# Patient Record
Sex: FEMALE | URBAN - METROPOLITAN AREA
[De-identification: names, ages, dates, MRNs, and addresses within clinical notes are randomized per-mention and may not be internally consistent; named-entity substitution may affect disease eponyms.]

---

## 2019-07-19 ENCOUNTER — OUTPATIENT (OUTPATIENT)
Dept: OUTPATIENT SERVICES | Facility: HOSPITAL | Age: 69
LOS: 1 days | Discharge: HOME | End: 2019-07-19
Payer: COMMERCIAL

## 2019-07-19 VITALS
HEIGHT: 68 IN | RESPIRATION RATE: 16 BRPM | TEMPERATURE: 97 F | OXYGEN SATURATION: 100 % | HEART RATE: 61 BPM | WEIGHT: 128.75 LBS | DIASTOLIC BLOOD PRESSURE: 83 MMHG | SYSTOLIC BLOOD PRESSURE: 174 MMHG

## 2019-07-19 DIAGNOSIS — O03.9 COMPLETE OR UNSPECIFIED SPONTANEOUS ABORTION WITHOUT COMPLICATION: Chronic | ICD-10-CM

## 2019-07-19 DIAGNOSIS — Z98.891 HISTORY OF UTERINE SCAR FROM PREVIOUS SURGERY: Chronic | ICD-10-CM

## 2019-07-19 DIAGNOSIS — Z98.890 OTHER SPECIFIED POSTPROCEDURAL STATES: Chronic | ICD-10-CM

## 2019-07-19 RX ORDER — ONDANSETRON 8 MG/1
4 TABLET, FILM COATED ORAL EVERY 8 HOURS
Refills: 0 | Status: DISCONTINUED | OUTPATIENT
Start: 2019-07-19 | End: 2019-07-20

## 2019-07-19 RX ORDER — SODIUM CHLORIDE 9 MG/ML
1000 INJECTION, SOLUTION INTRAVENOUS
Refills: 0 | Status: DISCONTINUED | OUTPATIENT
Start: 2019-07-19 | End: 2019-07-20

## 2019-07-19 RX ORDER — HEPARIN SODIUM 5000 [USP'U]/ML
5000 INJECTION INTRAVENOUS; SUBCUTANEOUS ONCE
Refills: 0 | Status: DISCONTINUED | OUTPATIENT
Start: 2019-07-19 | End: 2019-07-19

## 2019-07-19 RX ORDER — CEFAZOLIN SODIUM 1 G
1000 VIAL (EA) INJECTION EVERY 8 HOURS
Refills: 0 | Status: DISCONTINUED | OUTPATIENT
Start: 2019-07-19 | End: 2019-07-20

## 2019-07-19 RX ORDER — HYDROMORPHONE HYDROCHLORIDE 2 MG/ML
0.2 INJECTION INTRAMUSCULAR; INTRAVENOUS; SUBCUTANEOUS
Refills: 0 | Status: DISCONTINUED | OUTPATIENT
Start: 2019-07-19 | End: 2019-07-20

## 2019-07-19 RX ORDER — OXYCODONE HYDROCHLORIDE 5 MG/1
5 TABLET ORAL EVERY 4 HOURS
Refills: 0 | Status: DISCONTINUED | OUTPATIENT
Start: 2019-07-19 | End: 2019-07-20

## 2019-07-19 RX ORDER — LEVOTHYROXINE SODIUM 125 MCG
75 TABLET ORAL DAILY
Refills: 0 | Status: DISCONTINUED | OUTPATIENT
Start: 2019-07-20 | End: 2019-07-20

## 2019-07-19 RX ORDER — HEPARIN SODIUM 5000 [USP'U]/ML
5000 INJECTION INTRAVENOUS; SUBCUTANEOUS EVERY 12 HOURS
Refills: 0 | Status: DISCONTINUED | OUTPATIENT
Start: 2019-07-19 | End: 2019-07-20

## 2019-07-19 RX ORDER — METOPROLOL TARTRATE 50 MG
5 TABLET ORAL EVERY 6 HOURS
Refills: 0 | Status: DISCONTINUED | OUTPATIENT
Start: 2019-07-19 | End: 2019-07-20

## 2019-07-19 RX ORDER — ACETAMINOPHEN 500 MG
650 TABLET ORAL EVERY 6 HOURS
Refills: 0 | Status: DISCONTINUED | OUTPATIENT
Start: 2019-07-19 | End: 2019-07-20

## 2019-07-19 RX ORDER — HYDRALAZINE HCL 50 MG
10 TABLET ORAL ONCE
Refills: 0 | Status: COMPLETED | OUTPATIENT
Start: 2019-07-19 | End: 2019-07-19

## 2019-07-19 RX ADMIN — Medication 10 MILLIGRAM(S): at 17:27

## 2019-07-19 RX ADMIN — Medication 100 MILLIGRAM(S): at 21:34

## 2019-07-19 NOTE — DISCHARGE NOTE PROVIDER - CARE PROVIDER_API CALL
Rosalinda Odom)  Otolaryngology  [address] [place] [address] [address] [place] [address]  Phone: [phone]  Fax: [phone]  Follow Up Time: 1 week

## 2019-07-19 NOTE — DISCHARGE NOTE PROVIDER - NSDCFUADDINST_GEN_ALL_CORE_FT
Discharge Instructions  Diet: please resume a regular diet    Activity: no heavy lifting, nothing heavier than 10lbs (gallon of milk); no physical activity or activity that will increase your heart rate until cleared by your MD    Wound care: you may shower, no baths; let warm soap and water wash over the incisions, do not scrub, pat to dry    Medications: please resume all home medications; for pain, please take Tylenol over the counter, 500-650mg every 4-6 hours    Appointments: please call Dr. Odom's office for an appointment

## 2019-07-19 NOTE — H&P ADULT - NSICDXPASTMEDICALHX_GEN_ALL_CORE_FT
PAST MEDICAL HISTORY:  Arthritis of finger of both hands     Cervicalgia     Enlarged lymph node left side of face    HLD (hyperlipidemia)     Hypertension     Hypothyroidism

## 2019-07-19 NOTE — H&P ADULT - ASSESSMENT
A/P:  68F s/p left superficial parotidectomy for pleomorphic adenoma  -monitor SCOTTIE output  -ancef while SCOTTIE in place  -resume home meds  -regular diet  -oob ad priti/SCDs/HSQ  -admit to regional, 23 hour observation  -Call ENT with questions/concerns

## 2019-07-19 NOTE — PACU DISCHARGE NOTE - COMMENTS
axo3 Denies pain No numbness/tingling of facial region. No facial asymetry. Left ear/neck region incision intact SCOTTIE draining small amt of darn serosangious driange. Able to mingo liquids Voided qs. Report given to unit nurse for transfer in bed to 845

## 2019-07-19 NOTE — DISCHARGE NOTE PROVIDER - HOSPITAL COURSE
68F admitted s/p left superficial parotidectomy with facial nerve dissection for surgical site monitoring and SCOTTIE care.  No acute events overnight.  SCOTTIE output x, and deemed stable for removal.  Tolerating PO, pain controlled, ambulating ad priti.  Deemed medically stable for dc home. Otolaryngology - Head & Neck Surgery Discharge Summary        68F admitted s/p left superficial parotidectomy with facial nerve dissection for surgical site monitoring and SCOTTIE care.  No acute events overnight.  SCOTTIE output 32cc, and deemed stable for removal.  Tolerating PO, pain controlled, ambulating ad priti.  Deemed medically stable for dc home.            Discharge Instructions    Diet: please resume a regular diet        Activity: no heavy lifting, nothing heavier than 10lbs (gallon of milk); no physical activity or activity that will increase your heart rate until cleared by your MD        Wound care: you may shower, no baths; let warm soap and water wash over the incisions, do not scrub, pat to dry        Medications: please resume all home medications; for pain, please take Tylenol over the counter, 500-650mg every 4-6 hours        Appointments: please call Dr. Odom's office for an appointment

## 2019-07-19 NOTE — DISCHARGE NOTE PROVIDER - NSDCACTIVITY_GEN_ALL_CORE
Driving allowed/Showering allowed/Walking - Outdoors allowed/No heavy lifting/straining/Do not drive or operate machinery/Stairs allowed/Walking - Indoors allowed

## 2019-07-19 NOTE — ASU PATIENT PROFILE, ADULT - PMH
Arthritis of finger of both hands    Cervicalgia    Enlarged lymph node  left side of face  HLD (hyperlipidemia)    Hypertension    Hypothyroidism

## 2019-07-19 NOTE — H&P ADULT - NSICDXPASTSURGICALHX_GEN_ALL_CORE_FT
PAST SURGICAL HISTORY:  Complete miscarriage     H/O foot surgery left foot 2016    History of  1988

## 2019-07-19 NOTE — H&P ADULT - HISTORY OF PRESENT ILLNESS
Pt with enlarging left parotid mass, admitted s/p left superficial parotidectomy.  Pt tolerated the procedure well.  Admitted for pain control, SCOTTIE and wound monitoring.

## 2019-07-20 VITALS
DIASTOLIC BLOOD PRESSURE: 66 MMHG | OXYGEN SATURATION: 97 % | SYSTOLIC BLOOD PRESSURE: 145 MMHG | RESPIRATION RATE: 15 BRPM | HEART RATE: 53 BPM | TEMPERATURE: 98 F

## 2019-07-20 LAB
HCV AB S/CO SERPL IA: 0.06 S/CO — SIGNIFICANT CHANGE UP
HCV AB SERPL-IMP: SIGNIFICANT CHANGE UP

## 2019-07-20 PROCEDURE — 42420 EXCISE PAROTID GLAND/LESION: CPT | Mod: LT

## 2019-07-20 PROCEDURE — 36415 COLL VENOUS BLD VENIPUNCTURE: CPT

## 2019-07-20 PROCEDURE — 86803 HEPATITIS C AB TEST: CPT

## 2019-07-20 PROCEDURE — 88307 TISSUE EXAM BY PATHOLOGIST: CPT

## 2019-07-20 RX ORDER — ACETAMINOPHEN 500 MG
2 TABLET ORAL
Qty: 0 | Refills: 0 | DISCHARGE
Start: 2019-07-20

## 2019-07-20 RX ORDER — LEVOTHYROXINE SODIUM 125 MCG
1 TABLET ORAL
Qty: 0 | Refills: 0 | DISCHARGE
Start: 2019-07-20

## 2019-07-20 RX ORDER — METOPROLOL TARTRATE 50 MG
0 TABLET ORAL
Qty: 0 | Refills: 0 | DISCHARGE
Start: 2019-07-20

## 2019-07-20 RX ADMIN — Medication 75 MICROGRAM(S): at 05:44

## 2019-07-20 RX ADMIN — Medication 100 MILLIGRAM(S): at 05:44

## 2019-07-20 RX ADMIN — Medication 650 MILLIGRAM(S): at 10:28

## 2019-07-20 RX ADMIN — HEPARIN SODIUM 5000 UNIT(S): 5000 INJECTION INTRAVENOUS; SUBCUTANEOUS at 05:44

## 2019-07-20 NOTE — DISCHARGE NOTE NURSING/CASE MANAGEMENT/SOCIAL WORK - NSDCDPATPORTLINK_GEN_ALL_CORE
You can access the [facility] Portal, offered by [facility], by registering with the following website: [email or web address]

## 2019-07-24 LAB — SURGICAL PATHOLOGY STUDY: SIGNIFICANT CHANGE UP

## 2021-03-22 NOTE — BRIEF OPERATIVE NOTE - NSICDXBRIEFPROCEDURE_GEN_ALL_CORE_FT
[FreeTextEntry1] : 16 yo girl with left distal radius ulna displaced facture currently in acceptable position after reduction. \par \par Today's visit included obtaining history from the child  parent due to the child's age, the child could not be considered a reliable historian, requiring parent to act as independent historian. Xrays were taken today out of LAC and demonstrated stable alignment of the left distal radius and ulna fracture. Her physis is open.\par There is still slight dorsal angulation but acceptable and improved. We will continue  to monitor alignment.  She was transitioned from LAC to SAC today. She will f/u in 2 week for xrays OUT of the cast.\par \par All questions and concerns were addressed today. Parent and patient verbalize understanding and agree with plan of care.\par AKIL, Rosanna Moses PA-C, have acted as a scribe and documented the above information for Dr. Cali.\par  \par \par \par  PROCEDURES:  Superficial parotidectomy or surgical removal of parotid neoplasm using nerve sparing technique 19-Jul-2019 16:19:34  Marifer Germain Admission